# Patient Record
Sex: MALE | Race: WHITE | HISPANIC OR LATINO | Employment: FULL TIME | URBAN - METROPOLITAN AREA
[De-identification: names, ages, dates, MRNs, and addresses within clinical notes are randomized per-mention and may not be internally consistent; named-entity substitution may affect disease eponyms.]

---

## 2020-03-19 ENCOUNTER — OFFICE VISIT (OUTPATIENT)
Dept: URGENT CARE | Facility: CLINIC | Age: 50
End: 2020-03-19
Payer: COMMERCIAL

## 2020-03-19 VITALS
TEMPERATURE: 98.1 F | WEIGHT: 180 LBS | BODY MASS INDEX: 27.28 KG/M2 | DIASTOLIC BLOOD PRESSURE: 76 MMHG | RESPIRATION RATE: 20 BRPM | HEART RATE: 76 BPM | HEIGHT: 68 IN | OXYGEN SATURATION: 98 % | SYSTOLIC BLOOD PRESSURE: 140 MMHG

## 2020-03-19 DIAGNOSIS — K21.9 GASTRIC REFLUX: ICD-10-CM

## 2020-03-19 DIAGNOSIS — Z72.0 TOBACCO ABUSE: ICD-10-CM

## 2020-03-19 DIAGNOSIS — I10 BENIGN ESSENTIAL HTN: ICD-10-CM

## 2020-03-19 DIAGNOSIS — E11.9 TYPE 2 DIABETES MELLITUS WITHOUT COMPLICATION, WITHOUT LONG-TERM CURRENT USE OF INSULIN (HCC): ICD-10-CM

## 2020-03-19 PROCEDURE — 99203 OFFICE O/P NEW LOW 30 MIN: CPT | Performed by: PHYSICIAN ASSISTANT

## 2020-03-19 RX ORDER — OMEPRAZOLE 20 MG/1
20 CAPSULE, DELAYED RELEASE ORAL DAILY
Qty: 30 CAP | Refills: 0 | Status: SHIPPED
Start: 2020-03-19 | End: 2020-03-25

## 2020-03-19 RX ORDER — GLIMEPIRIDE 4 MG/1
4 TABLET ORAL EVERY MORNING
COMMUNITY
End: 2020-03-25 | Stop reason: SDUPTHER

## 2020-03-19 NOTE — LETTER
March 19, 2020         Patient: Jairo Hernandez   YOB: 1970   Date of Visit: 3/19/2020           To Whom it May Concern:    Jairo Hernandez was seen in my clinic on 3/19/2020. He has no evidence of a respiratory infection and should be allowed to see his new PCP for ongoing management of chronic medical conditions.     If you have any questions or concerns, please don't hesitate to call.        Sincerely,           Margarita Antony P.A.-C.  Electronically Signed

## 2020-03-20 ENCOUNTER — TELEPHONE (OUTPATIENT)
Dept: HEALTH INFORMATION MANAGEMENT | Facility: OTHER | Age: 50
End: 2020-03-20

## 2020-03-20 NOTE — TELEPHONE ENCOUNTER
1. Caller Name: Adrianna                       Call Back Number: [phone]   [facility] PCP or Specialty Provider: Yes Obed - trying to establish with new provider        2.  Does patient have any active symptoms of respiratory illness (fever OR cough OR shortness of breath)? Yes, the patient reports the following respiratory symptoms: cough - smokers cough, seasonal allergies. Needs to see doctor for his diabetes, ran out of medication.     3.  Does patient have any comoribidities? DM    4.  In the last 30 days, has the patient traveled outside of the country OR in a high risk area within the [place] OR have any known contact with someone who has or is suspected to have COVID-19?  No.      4.  In the last 30 days, has the patient traveled outside of the country OR in a high risk area within the [place] OR have any known contact with someone who has or is suspected to have COVID-19?  No.    5. Disposition: Cleared by RN Triage; OK to keep/schedule appointment    Note routed to PCP: Provider action needed: Pt needs to establish care and needs refills of Trulicity (OUT), metformin, glimiperide, januvia, ompeprazole, losartan, montelukast, and symbicort. Has a chronic smokers cough.  Cleared to make appt.

## 2020-03-25 ENCOUNTER — OFFICE VISIT (OUTPATIENT)
Dept: MEDICAL GROUP | Facility: PHYSICIAN GROUP | Age: 50
End: 2020-03-25
Payer: COMMERCIAL

## 2020-03-25 VITALS
HEART RATE: 99 BPM | HEIGHT: 68 IN | DIASTOLIC BLOOD PRESSURE: 80 MMHG | BODY MASS INDEX: 24.1 KG/M2 | SYSTOLIC BLOOD PRESSURE: 128 MMHG | WEIGHT: 159 LBS | OXYGEN SATURATION: 94 % | TEMPERATURE: 99.4 F

## 2020-03-25 DIAGNOSIS — E11.65 TYPE 2 DIABETES MELLITUS WITH HYPERGLYCEMIA, WITHOUT LONG-TERM CURRENT USE OF INSULIN (HCC): ICD-10-CM

## 2020-03-25 DIAGNOSIS — R05.3 CHRONIC COUGH: ICD-10-CM

## 2020-03-25 DIAGNOSIS — K21.9 GASTRIC REFLUX: ICD-10-CM

## 2020-03-25 DIAGNOSIS — R91.1 LUNG NODULE: ICD-10-CM

## 2020-03-25 PROBLEM — F17.200 SMOKER UNMOTIVATED TO QUIT: Status: ACTIVE | Noted: 2020-03-25

## 2020-03-25 PROBLEM — E13.9 DIABETES 1.5, MANAGED AS TYPE 2 (HCC): Status: ACTIVE | Noted: 2020-03-25

## 2020-03-25 PROCEDURE — 99204 OFFICE O/P NEW MOD 45 MIN: CPT | Performed by: INTERNAL MEDICINE

## 2020-03-25 RX ORDER — DULAGLUTIDE 0.75 MG/.5ML
0.75 INJECTION, SOLUTION SUBCUTANEOUS
Qty: 1 PEN | Refills: 5 | Status: SHIPPED | OUTPATIENT
Start: 2020-03-25

## 2020-03-25 RX ORDER — MONTELUKAST SODIUM 10 MG/1
10 TABLET ORAL DAILY
Qty: 90 TAB | Refills: 1 | Status: SHIPPED | OUTPATIENT
Start: 2020-03-25

## 2020-03-25 RX ORDER — LOVASTATIN 20 MG/1
10 TABLET ORAL DAILY
Qty: 90 TAB | Refills: 1 | Status: SHIPPED | OUTPATIENT
Start: 2020-03-25

## 2020-03-25 RX ORDER — DILTIAZEM HYDROCHLORIDE 60 MG/1
1 TABLET, FILM COATED ORAL 2 TIMES DAILY
Qty: 1 INHALER | Refills: 3 | Status: SHIPPED | OUTPATIENT
Start: 2020-03-25

## 2020-03-25 RX ORDER — OMEPRAZOLE 40 MG/1
40 CAPSULE, DELAYED RELEASE ORAL DAILY
Qty: 90 CAP | Refills: 1 | Status: SHIPPED | OUTPATIENT
Start: 2020-03-25

## 2020-03-25 RX ORDER — GLIMEPIRIDE 4 MG/1
2 TABLET ORAL 2 TIMES DAILY
Qty: 180 TAB | Refills: 1 | Status: SHIPPED | OUTPATIENT
Start: 2020-03-25

## 2020-03-25 RX ORDER — LOSARTAN POTASSIUM 25 MG/1
25 TABLET ORAL DAILY
Qty: 45 TAB | Refills: 1 | Status: SHIPPED | OUTPATIENT
Start: 2020-03-25

## 2020-03-25 RX ORDER — LOVASTATIN 20 MG/1
TABLET ORAL
COMMUNITY
Start: 2020-03-20 | End: 2020-03-25 | Stop reason: SDUPTHER

## 2020-03-25 SDOH — HEALTH STABILITY: MENTAL HEALTH: HOW OFTEN DO YOU HAVE A DRINK CONTAINING ALCOHOL?: 2-4 TIMES A MONTH

## 2020-03-25 SDOH — HEALTH STABILITY: MENTAL HEALTH: HOW MANY STANDARD DRINKS CONTAINING ALCOHOL DO YOU HAVE ON A TYPICAL DAY?: 3 OR 4

## 2020-03-25 SDOH — HEALTH STABILITY: MENTAL HEALTH: HOW OFTEN DO YOU HAVE 6 OR MORE DRINKS ON ONE OCCASION?: WEEKLY

## 2020-03-25 ASSESSMENT — ENCOUNTER SYMPTOMS
CHILLS: 0
DIARRHEA: 0
SHORTNESS OF BREATH: 0
MUSCULOSKELETAL NEGATIVE: 1
FEVER: 0
ABDOMINAL PAIN: 0
VOMITING: 0
COUGH: 1
EYE REDNESS: 0
SPUTUM PRODUCTION: 0
EYE DISCHARGE: 0
NAUSEA: 0
DIZZINESS: 0

## 2020-03-25 NOTE — PROGRESS NOTES
"Subjective:      Jairo Hernandez is a 49 y.o. male who presents with Diabetes (Rx Refill) and Seasonal Allergies (Causing Cough )            HPI  Patient presents to urgent care requesting refills of all his regular medications. He is new to the area and needs to get established with a new PCP. He was told to come to urgent care by the triage nurse because he reported a cough, although he admits he always has a cough due to a long history of smoking. He denies fevers, chills, body aches, productive cough, hemoptysis, wheezing, or SOB. No known sick contacts. No recent travel.  PMH is significant for type 2 diabetes, HTN, hyperlipidemia, asthma, and GERD. He states he has a good supply on all medications except for Prilosec.     Review of Systems   Constitutional: Negative for chills and fever.   HENT: Negative for congestion.    Eyes: Negative for discharge and redness.   Respiratory: Positive for cough. Negative for sputum production and shortness of breath.    Cardiovascular: Negative for chest pain.   Gastrointestinal: Negative for abdominal pain, diarrhea, nausea and vomiting.   Genitourinary: Negative.    Musculoskeletal: Negative.    Skin: Negative for rash.   Neurological: Negative for dizziness.        Objective:     /76 (BP Location: Right arm, Patient Position: Sitting, BP Cuff Size: Adult)   Pulse 76   Temp 36.7 °C (98.1 °F) (Temporal)   Resp 20   Ht 1.727 m (5' 8\")   Wt 81.6 kg (180 lb) Comment: Patient stated  SpO2 98%   BMI 27.37 kg/m²        Physical Exam  Vitals signs and nursing note reviewed.   Constitutional:       Appearance: Normal appearance. He is well-developed.   HENT:      Head: Normocephalic and atraumatic.      Right Ear: Tympanic membrane, ear canal and external ear normal.      Left Ear: Tympanic membrane, ear canal and external ear normal.      Nose: No congestion or rhinorrhea.      Mouth/Throat:      Mouth: Mucous membranes are moist.      Pharynx: No oropharyngeal " exudate or posterior oropharyngeal erythema.   Eyes:      General:         Right eye: No discharge.         Left eye: No discharge.      Conjunctiva/sclera: Conjunctivae normal.      Pupils: Pupils are equal, round, and reactive to light.   Neck:      Musculoskeletal: Normal range of motion.   Cardiovascular:      Rate and Rhythm: Normal rate and regular rhythm.      Heart sounds: Normal heart sounds. No murmur.   Pulmonary:      Effort: Pulmonary effort is normal.      Breath sounds: Normal breath sounds. No wheezing or rales.   Abdominal:      General: Abdomen is flat. Bowel sounds are normal. There is no distension.      Tenderness: There is no abdominal tenderness. There is no right CVA tenderness, left CVA tenderness or guarding.   Musculoskeletal: Normal range of motion.   Skin:     General: Skin is warm and dry.   Neurological:      Mental Status: He is alert and oriented to person, place, and time.   Psychiatric:         Behavior: Behavior normal.            PMH:  has no past medical history on file.  MEDS:   Current Outpatient Medications:   •  metformin (GLUCOPHAGE) 1000 MG tablet, Take 1,000 mg by mouth 2 times a day, with meals., Disp: , Rfl:   •  glimepiride (AMARYL) 4 MG Tab, Take 4 mg by mouth every morning., Disp: , Rfl:   •  SITagliptin (JANUVIA) 100 MG Tab, Take 100 mg by mouth every day., Disp: , Rfl:   •  MONTELUKAST SODIUM PO, Take  by mouth., Disp: , Rfl:   •  omeprazole (PRILOSEC) 20 MG delayed-release capsule, Take 1 Cap by mouth every day., Disp: 30 Cap, Rfl: 0  •  Dulaglutide (TRULICITY SC), Inject  as instructed., Disp: , Rfl:   •  OMEPRAZOLE PO, Take  by mouth., Disp: , Rfl:   •  LOSARTAN POTASSIUM PO, Take  by mouth., Disp: , Rfl:   •  Budesonide-Formoterol Fumarate (SYMBICORT INH), Inhale  by mouth., Disp: , Rfl:   ALLERGIES: No Known Allergies  SURGHX: History reviewed. No pertinent surgical history.  SOCHX:    FH: family history is not on file.     Assessment/Plan:       1. Type 2  diabetes mellitus without complication, without long-term current use of insulin (HCC)      2. Tobacco abuse      3. Benign essential HTN      4. Gastric reflux    - omeprazole (PRILOSEC) 20 MG delayed-release capsule; Take 1 Cap by mouth every day.  Dispense: 30 Cap; Refill: 0    Refill of prilosec provided at today's visit. He will become established with a new PCP and follow up for ongoing management of chronic medical conditions. The patient demonstrated a good understanding and agreed with the treatment plan.

## 2020-03-26 ENCOUNTER — TELEPHONE (OUTPATIENT)
Dept: MEDICAL GROUP | Facility: PHYSICIAN GROUP | Age: 50
End: 2020-03-26

## 2020-03-26 NOTE — PROGRESS NOTES
New Patient to establish    Chief Complaint   Patient presents with   • Diabetes Follow-up     Medication refills       Subjective:     History of Present Illness: Patient is a 49 y.o. male who is here today to establish primary care, refill medication  -Working with complying as welding, refinery, has PCP in [place], also lung doctor in [facility]    1. Lung nodule  Tobacco use disorder  -More than 1 pack/day for 25 years, has no intention to quit  -Mention diagnosis in [facility] by pulmonary specialist  - Advised to follow-up with CAT scan yearly for now>> mention last CAT scan showed stable lung nodule  -Denied having alarm signs for lung cancer    2. Chronic cough  -Patient has seasonal allergies, however has chronic cough perennially, not sure if he had asthma or COPD or other lung disease diagnosis  -Patient stopped taking Symbicort for chronic cough as well as Singulair  -Not sure about the dosage  -Mention run out of his old medication almost 4 months ago secondary to no insurance  -Currently having chronic cough with mild phlegm, denied having wheezes, shortness of breath or chest pain    3. Gastric reflux  -Chronic, compliant with Prilosec 40 mg daily      4. Type 2 diabetes mellitus with hyperglycemia, without long-term current use of insulin (HCC)  -Chronic, more than 10 years, mention randomly check blood glucose with range of from 200-400, however not compliant with regular checking  - Currently eating less and losing weight actively intentionally to avoid hypoglycemia and complication secondary to running out of diabetes medication due to loss of insurance for at least 4 months    -Patient was taking Trulicity not sure about the dose, glimepiride 2 mg twice daily, metformin 1000 mg twice daily as well as losartan maybe 10 mg per patient and not for hypertension  -Denied having eye symptoms, patient has a eye appointment in April 2020  -Has chronic tingling numbness of the toes  -Also  "drinking around 18 beers a week      No current outpatient medications on file prior to visit.     No current facility-administered medications on file prior to visit.      No Known Allergies  Patient Active Problem List    Diagnosis Date Noted   • Smoker unmotivated to quit 03/25/2020   • Lung nodule 03/25/2020   • Chronic cough 03/25/2020   • Type 2 diabetes mellitus with hyperglycemia, without long-term current use of insulin (HCC) 03/25/2020     Past Medical History:   Diagnosis Date   • Allergy    • Diabetes (HCC)      No past surgical history on file.  Family History   Problem Relation Age of Onset   • Diabetes Mother    • Stroke Mother    • Diabetes Father    • Diabetes Sister    • No Known Problems Brother      Social History     Tobacco Use   • Smoking status: Current Every Day Smoker     Packs/day: 1.00     Years: 25.00     Pack years: 25.00     Types: Cigarettes   • Smokeless tobacco: Never Used   Substance Use Topics   • Alcohol use: Yes     Alcohol/week: 3.6 oz     Types: 6 Cans of beer per week     Frequency: 2-4 times a month     Drinks per session: 3 or 4     Binge frequency: Weekly   • Drug use: Not Currently       ROS:     - Constitutional: Negative for fever, chills,    - Eye: Negative for blurry vision    -ENT: Negative for ear pain    - Respiratory: Negative for  hemoptysis    - Cardiovascular: Negative for chest pain     - Gastrointestinal: Negative for abdominal pain    - Genitourinary: Negative for dysuria    - Musculoskeletal: Negative for joint swelling    - Skin: Negative for itching    - Neurological: Negative for focal weakness     - Psychiatric/Behavioral: Negative for depression        Physical Exam:     /80 (BP Location: Left arm, Patient Position: Sitting, BP Cuff Size: Adult)   Pulse 99   Temp 37.4 °C (99.4 °F) (Temporal)   Ht 1.727 m (5' 8\")   Wt 72.1 kg (159 lb)   SpO2 94%   BMI 24.18 kg/m²   General: Normal appearing. No distress.  ENT: oropharynx without exudates.  "   Eyes: conjunctiva clear lids without ptosis  Pulmonary: Clear to ausculation.  Normal effort.   Cardiovascular: Regular rate and rhythm  Abdomen: Soft, nontender,  Lymph: No cervical or supraclavicular palpable lymph nodes  Psych: Normal mood and affect.   Monofilament testing with a 10 gram force: sensation intact: intact bilaterally  Visual Inspection: Feet without maceration, ulcers, fissures.  Pedal pulses: intact bilaterally      I have reviewed pertinent labs and diagnostic tests associated with this visit with specific comments listed under the assessment and plan below      Assessment and Plan:     1. Lung nodule  Tobacco use:  - Unwilling to quit at this point despite offering help with some medications and resources  -Counseled about the negative health risks of smoking, pt expressed understanding  -Encouraged smoking cessation  -CTM and f/u with support to quit     -Advised to sign to get records from pulmonary specialist in [facility] regarding CAT scan for lung nodule follow-up      2. Chronic cough  -Advised to sign to get records from [place] PCP regarding exact diagnosis of whether asthma, COPD or other issues    - budesonide-formoterol (SYMBICORT) 80-4.5 MCG/ACT Aerosol; Inhale 1 Puff by mouth 2 Times a Day.  Dispense: 1 Inhaler; Refill: 3  - montelukast (SINGULAIR) 10 MG Tab; Take 1 Tab by mouth every day.  Dispense: 90 Tab; Refill: 1    3. Gastric reflux  - omeprazole (PRILOSEC) 40 MG delayed-release capsule; Take 1 Cap by mouth every day.  Dispense: 90 Cap; Refill: 1    4. Type 2 diabetes mellitus with hyperglycemia, without long-term current use of insulin (HCC)  -Noncompliant with diet, as well as medications  - Dulaglutide (TRULICITY) 0.75 MG/0.5ML Solution Pen-injector; Inject 0.75 mg as instructed every 7 days.  Dispense: 1 PEN; Refill: 5  - glimepiride (AMARYL) 4 MG Tab; Take 0.5 Tabs by mouth 2 times a day.  Dispense: 180 Tab; Refill: 1  - metformin (GLUCOPHAGE) 1000 MG tablet; Take  1 Tab by mouth 2 times a day, with meals.  Dispense: 180 Tab; Refill: 1  - lovastatin (MEVACOR) 20 MG Tab; Take 0.5 Tabs by mouth every day.  Dispense: 90 Tab; Refill: 1  - losartan (COZAAR) 25 MG Tab; Take 1 Tab by mouth every day.  Dispense: 45 Tab; Refill: 1  - CBC WITH DIFFERENTIAL; Future  - HEMOGLOBIN A1C; Future  - Comp Metabolic Panel; Future  - Lipid Profile; Future  - TSH WITH REFLEX TO FT4; Future    -Discussed diabetic diet, educated regarding management of hypoglycemia, advised regular exercise, educated disease management and weight goals as well as feet care and frequent check of feet.  -Patient to check early morning fasting blood glucose with goal discussed.  - asked to have a BG log with daily fasting and 2 hr postprandial after biggest meal and bring to next visit or send through my chart  -Discussed side effects of medication. Patient confirmed understanding of information.      Follow Up:      Return in about 3 weeks (around 4/15/2020) for follow up DMII.    Please note that this dictation was created using voice recognition software. I have made every reasonable attempt to correct obvious errors, but I expect that there are errors of grammar and possibly content that I did not discover before finalizing the note.    Signed by: Obed Clay M.D.

## 2020-03-27 ENCOUNTER — TELEPHONE (OUTPATIENT)
Dept: MEDICAL GROUP | Facility: PHYSICIAN GROUP | Age: 50
End: 2020-03-27

## 2020-03-27 NOTE — TELEPHONE ENCOUNTER
FINAL PRIOR AUTHORIZATION STATUS:    1.  Name of Medication & Dose: TRULICITY 0.75MG/0.5ML     2. Prior Auth Status: Approved through [employer] VIA PHONE     3. Action Taken: Pharmacy Notified: yes Patient Notified: yes

## 2020-04-16 ENCOUNTER — TELEPHONE (OUTPATIENT)
Dept: MEDICAL GROUP | Facility: PHYSICIAN GROUP | Age: 50
End: 2020-04-16

## 2020-04-16 NOTE — TELEPHONE ENCOUNTER
Called patient, patient is not able to make an appointment today and consult secondary of losing his job, he is currently in [place]  - Patient would like to be seen by me again when he comes to [place] and start his new job.  -I agree with patient to be seen by me in [place] even for few days to discuss about his chronic problem.

## 2023-05-22 ENCOUNTER — DOCUMENTATION (OUTPATIENT)
Dept: HEALTH INFORMATION MANAGEMENT | Facility: OTHER | Age: 53
End: 2023-05-22